# Patient Record
Sex: FEMALE | Race: WHITE | Employment: FULL TIME | URBAN - METROPOLITAN AREA
[De-identification: names, ages, dates, MRNs, and addresses within clinical notes are randomized per-mention and may not be internally consistent; named-entity substitution may affect disease eponyms.]

---

## 2023-12-04 ENCOUNTER — TELEPHONE (OUTPATIENT)
Dept: PRIMARY CARE | Facility: CLINIC | Age: 53
End: 2023-12-04
Payer: COMMERCIAL

## 2023-12-04 NOTE — TELEPHONE ENCOUNTER
Needs appt. Can probably see [facility] blood work but she needs in office visit. Had TV 6/2023 and was planned to have 3 mos follow up for BP check and has not been seen. Cannot continue to make adjustments without seeing in office.

## 2023-12-04 NOTE — TELEPHONE ENCOUNTER
Pt says her radiation office wants her to have [facility] lower the dosage of her BP medication. She says she has blood work appointment coming up with [facility] and is wanting to know if [facility] can access those so then she wouldn't need to go again for [facility]. Please advise.

## 2023-12-06 ENCOUNTER — OFFICE VISIT (OUTPATIENT)
Dept: PRIMARY CARE | Facility: CLINIC | Age: 53
End: 2023-12-06
Payer: COMMERCIAL

## 2023-12-06 ENCOUNTER — TELEPHONE (OUTPATIENT)
Dept: PRIMARY CARE | Facility: CLINIC | Age: 53
End: 2023-12-06

## 2023-12-06 VITALS
DIASTOLIC BLOOD PRESSURE: 76 MMHG | SYSTOLIC BLOOD PRESSURE: 126 MMHG | WEIGHT: 136 LBS | BODY MASS INDEX: 25.7 KG/M2 | OXYGEN SATURATION: 99 % | HEART RATE: 108 BPM

## 2023-12-06 DIAGNOSIS — I10 ESSENTIAL HYPERTENSION: Primary | ICD-10-CM

## 2023-12-06 PROCEDURE — 3078F DIAST BP <80 MM HG: CPT | Performed by: STUDENT IN AN ORGANIZED HEALTH CARE EDUCATION/TRAINING PROGRAM

## 2023-12-06 PROCEDURE — 3074F SYST BP LT 130 MM HG: CPT | Performed by: STUDENT IN AN ORGANIZED HEALTH CARE EDUCATION/TRAINING PROGRAM

## 2023-12-06 PROCEDURE — 1036F TOBACCO NON-USER: CPT | Performed by: STUDENT IN AN ORGANIZED HEALTH CARE EDUCATION/TRAINING PROGRAM

## 2023-12-06 PROCEDURE — 99214 OFFICE O/P EST MOD 30 MIN: CPT | Performed by: STUDENT IN AN ORGANIZED HEALTH CARE EDUCATION/TRAINING PROGRAM

## 2023-12-06 RX ORDER — ASPIRIN 81 MG/1
81 TABLET ORAL
COMMUNITY
Start: 2023-11-12 | End: 2023-12-12

## 2023-12-06 ASSESSMENT — PATIENT HEALTH QUESTIONNAIRE - PHQ9
2. FEELING DOWN, DEPRESSED OR HOPELESS: NOT AT ALL
SUM OF ALL RESPONSES TO PHQ9 QUESTIONS 1 AND 2: 0
1. LITTLE INTEREST OR PLEASURE IN DOING THINGS: NOT AT ALL

## 2023-12-06 ASSESSMENT — PAIN SCALES - GENERAL: PAINLEVEL: 0-NO PAIN

## 2023-12-06 NOTE — PROGRESS NOTES
GENERAL PROGRESS NOTE    Chief Complaint   Patient presents with    Hypertension     Pt says she is no longer taking BP meds per her Dr. Bruno        HPI  Hedy Cuevas is a 53 y.o. female that presents today for HTN follow up. Was taken off HTN medication by Dr. Bruno her radiation oncologist. She last had radiation 4/2023. Is also finished with chemo. Had hemicolectomy 11/08, went well. Saw stoma nurst, surgeon and ongolocist recently 11/30 and at that time she was advised to stop taking her antihypertensive due to persistently low BP readings. Not exactly sure of what her reading was at that time. Was on lisinopril. Previously on both lisinopril and hydrochlorothiazide but hydrochlorothiazide stopped previously d/t hypokalemia.  Has home BP monitor, has not been checking. She was also advised at her last visit that she needs to drink more water as she was also tachycardic. She drinks 32 oz water a day and also drinks lemonade/juice and soda. Was told only to drink water and to get 64 oz. Has been feeling well, no complaints today.          Vital signs   /76   Pulse 108   Wt 61.7 kg (136 lb)   SpO2 99%   BMI 25.70 kg/m²      No current outpatient medications on file.     No current facility-administered medications for this visit.       Review of Systems   Constitutional:  Negative for chills and fever.   Eyes:  Negative for visual disturbance.   Respiratory:  Negative for shortness of breath.    Cardiovascular:  Negative for chest pain, palpitations and leg swelling.   Neurological:  Negative for dizziness and headaches.        Physical Exam  Vitals and nursing note reviewed.   Constitutional:       General: She is not in acute distress.     Appearance: She is not ill-appearing.   Cardiovascular:      Rate and Rhythm: Normal rate and regular rhythm.      Heart sounds: Normal heart  sounds. No murmur heard.  Pulmonary:      Effort: Pulmonary effort is normal. No respiratory distress.      Breath sounds: Normal breath sounds. No wheezing, rhonchi or rales.   Musculoskeletal:      Right lower leg: No edema.      Left lower leg: No edema.         Assessment/Plan   1. Essential hypertension  Normotensive today. Currently off all antihypertensives. Will remain off. Start home BP monitoring. Advised to reach out with any persistently elevated values.      Lillie Ellison MD  12/06/23  1:06 PM

## 2023-12-20 ASSESSMENT — ENCOUNTER SYMPTOMS
DIZZINESS: 0
SHORTNESS OF BREATH: 0
PALPITATIONS: 0
CHILLS: 0
HEADACHES: 0
FEVER: 0

## 2024-03-06 PROBLEM — I10 ESSENTIAL HYPERTENSION: Status: ACTIVE | Noted: 2022-07-21

## 2024-03-06 PROBLEM — Z85.048 HISTORY OF RECTAL CANCER: Status: ACTIVE | Noted: 2024-02-16

## 2024-03-06 PROBLEM — Z93.2 PRESENCE OF ILEOSTOMY (MULTI): Status: ACTIVE | Noted: 2023-11-09

## 2024-03-06 PROBLEM — D50.0 IRON DEFICIENCY ANEMIA DUE TO CHRONIC BLOOD LOSS: Status: ACTIVE | Noted: 2023-02-24

## 2024-03-06 PROBLEM — Z98.890 S/P CLOSURE OF ILEOSTOMY: Status: ACTIVE | Noted: 2024-02-21

## 2024-03-06 PROBLEM — C18.9 COLON CANCER (MULTI): Status: ACTIVE | Noted: 2023-07-12

## 2024-03-06 RX ORDER — CAPECITABINE 150 MG/1
TABLET, FILM COATED ORAL
COMMUNITY
Start: 2023-03-10

## 2024-03-06 RX ORDER — POTASSIUM CHLORIDE 20 MEQ/1
TABLET, EXTENDED RELEASE ORAL
COMMUNITY
Start: 2023-03-31

## 2024-03-06 RX ORDER — POTASSIUM CHLORIDE 1.5 G/1
POWDER, FOR SOLUTION ORAL
COMMUNITY
Start: 2023-03-23

## 2024-03-06 RX ORDER — LISINOPRIL 20 MG/1
20 TABLET ORAL
COMMUNITY
Start: 2023-06-30

## 2024-03-06 RX ORDER — LORAZEPAM 1 MG/1
TABLET ORAL
COMMUNITY
Start: 2023-03-30

## 2024-03-07 ENCOUNTER — APPOINTMENT (OUTPATIENT)
Dept: PRIMARY CARE | Facility: CLINIC | Age: 54
End: 2024-03-07
Payer: COMMERCIAL

## 2024-03-19 ENCOUNTER — TELEPHONE (OUTPATIENT)
Dept: PRIMARY CARE | Facility: CLINIC | Age: 54
End: 2024-03-19
Payer: COMMERCIAL

## 2024-03-19 DIAGNOSIS — I10 ESSENTIAL HYPERTENSION: ICD-10-CM

## 2024-05-30 ENCOUNTER — APPOINTMENT (OUTPATIENT)
Dept: PRIMARY CARE | Facility: CLINIC | Age: 54
End: 2024-05-30
Payer: COMMERCIAL